# Patient Record
Sex: MALE | Race: WHITE | URBAN - METROPOLITAN AREA
[De-identification: names, ages, dates, MRNs, and addresses within clinical notes are randomized per-mention and may not be internally consistent; named-entity substitution may affect disease eponyms.]

---
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
## 2019-01-02 RX ORDER — ERGOCALCIFEROL 1.25 MG/1
CAPSULE ORAL
Qty: 9 CAP | Refills: 0 | OUTPATIENT
Start: 2019-01-02

## 2019-01-02 NOTE — TELEPHONE ENCOUNTER
Unable to reach patient due to calling restriction on his phone. Letter mailed to notify patient since this is the only phone number we have on file for patient.

## 2019-03-25 ENCOUNTER — TELEPHONE (OUTPATIENT)
Dept: INTERNAL MEDICINE CLINIC | Age: 19
End: 2019-03-25

## 2019-03-25 NOTE — TELEPHONE ENCOUNTER
Called to triage patient per NICOLE Shabazz, patient is currently scheduled with Dr. Phuong Longoria today for head cold. Called and spoke with patient he stated that his main reason for coming in is for a tooth ache. He stated that the tooth ache has been going on for 2 plus weeks and that he has tried OTC measures and not much is helping. He stated that he has been in contact with his Dentist and is currently scheduled to be seen for Friday 3/29/19. I spoke with NICOLE Shabazz who advised for patient to alternate tylenol and ibuprofen for the pain and to keep mouth clean by using Listerine and can also use Orajel to help with the tooth pain. I explained that Sherrell Bryan stated that we are limited with what we can do for dental issues in the office and that seeing the Dentist is what she advises along with the above treatment. Patient verbalized understanding and had no additional questions. Will cancel patients appointment for today and he will follow up with his dentist on Friday 3/29/19.

## 2019-08-20 ENCOUNTER — OFFICE VISIT (OUTPATIENT)
Dept: INTERNAL MEDICINE CLINIC | Age: 19
End: 2019-08-20

## 2019-08-20 VITALS
OXYGEN SATURATION: 98 % | BODY MASS INDEX: 26.22 KG/M2 | TEMPERATURE: 98.6 F | HEART RATE: 89 BPM | HEIGHT: 68 IN | WEIGHT: 173 LBS | SYSTOLIC BLOOD PRESSURE: 120 MMHG | DIASTOLIC BLOOD PRESSURE: 60 MMHG | RESPIRATION RATE: 12 BRPM

## 2019-08-20 DIAGNOSIS — J01.00 ACUTE NON-RECURRENT MAXILLARY SINUSITIS: Primary | ICD-10-CM

## 2019-08-20 DIAGNOSIS — H69.82 DYSFUNCTION OF LEFT EUSTACHIAN TUBE: ICD-10-CM

## 2019-08-20 RX ORDER — AMOXICILLIN 500 MG/1
CAPSULE ORAL
Qty: 21 CAP | Refills: 0 | Status: SHIPPED | OUTPATIENT
Start: 2019-08-20 | End: 2019-10-18 | Stop reason: ALTCHOICE

## 2019-08-20 NOTE — PROGRESS NOTES
Kristi Xiong III,born 2000, is a 20 y.o. male, who is seen today for a one-month history of some pressure in the maxillary regions and some postnasal drip. He has to speak at work and he is having mucus in his throat now all for the last week or so pressure in the left ear. No fever chills. No cough. Started amoxicillin, leftover from a prescription given to his grandmother, has been using it for 2 days. He has not used any other medicine. History reviewed. No pertinent past medical history. Current Outpatient Medications   Medication Sig Dispense Refill    amoxicillin (AMOXIL) 500 mg capsule 1 capsule by mouth 3 times a day 21 Cap 0     Visit Vitals  /60 (BP 1 Location: Left arm, BP Patient Position: Sitting)   Pulse 89   Temp 98.6 °F (37 °C) (Oral)   Resp 12   Ht 5' 8\" (1.727 m)   Wt 173 lb (78.5 kg)   SpO2 98%   BMI 26.30 kg/m²     The left tympanic membrane is dull but there is no redness or wax. On the right there is normal light reflex from the tympanic membrane and no redness or wax. Pharynx reveals rather large tonsils bilaterally but no redness or exudate. Neck reveals no adenopathy or tenderness. There is no tenderness to press on the left ear. Nasal passages are clear. X-ray sinuses minimally tender bilaterally. Assessment: Maxillary sinus pressure and possible infection, going on for nearly a month. Will treat with another 7 days of amoxicillin for a total of 9 days, and have recommended he get some Sudafed to improve the eustachian tube dysfunction that he is currently having on the left possible serous otitis media on the left. Rommel Ramos MD FACP    Please note: This document has been produced using voice recognition software. Unrecognized errors in transcription may be present.

## 2019-10-18 ENCOUNTER — OFFICE VISIT (OUTPATIENT)
Dept: INTERNAL MEDICINE CLINIC | Age: 19
End: 2019-10-18

## 2019-10-18 ENCOUNTER — HOSPITAL ENCOUNTER (OUTPATIENT)
Dept: LAB | Age: 19
Discharge: HOME | End: 2019-10-18
Payer: COMMERCIAL

## 2019-10-18 VITALS
WEIGHT: 174.4 LBS | BODY MASS INDEX: 26.43 KG/M2 | HEART RATE: 88 BPM | OXYGEN SATURATION: 100 % | DIASTOLIC BLOOD PRESSURE: 83 MMHG | SYSTOLIC BLOOD PRESSURE: 122 MMHG | RESPIRATION RATE: 14 BRPM | HEIGHT: 68 IN | TEMPERATURE: 98.2 F

## 2019-10-18 DIAGNOSIS — Z23 ENCOUNTER FOR IMMUNIZATION: ICD-10-CM

## 2019-10-18 DIAGNOSIS — E55.9 VITAMIN D DEFICIENCY: ICD-10-CM

## 2019-10-18 DIAGNOSIS — R53.83 FATIGUE, UNSPECIFIED TYPE: ICD-10-CM

## 2019-10-18 DIAGNOSIS — J30.9 ALLERGIC RHINITIS, UNSPECIFIED SEASONALITY, UNSPECIFIED TRIGGER: ICD-10-CM

## 2019-10-18 DIAGNOSIS — F32.1 MODERATE MAJOR DEPRESSION (HCC): Primary | ICD-10-CM

## 2019-10-18 LAB
ALBUMIN SERPL-MCNC: 4.6 G/DL (ref 3.4–5)
ALBUMIN/GLOB SERPL: 1.6 {RATIO} (ref 0.8–1.7)
ALP SERPL-CCNC: 79 U/L (ref 45–117)
ALT SERPL-CCNC: 36 U/L (ref 16–61)
ANION GAP SERPL CALC-SCNC: 11 MMOL/L (ref 3–18)
AST SERPL-CCNC: 20 U/L (ref 10–38)
BASOPHILS # BLD: 0 K/UL (ref 0–0.1)
BASOPHILS NFR BLD: 0 % (ref 0–2)
BILIRUB SERPL-MCNC: 0.4 MG/DL (ref 0.2–1)
BUN SERPL-MCNC: 13 MG/DL (ref 7–18)
BUN/CREAT SERPL: 13 (ref 12–20)
CALCIUM SERPL-MCNC: 9.2 MG/DL (ref 8.5–10.1)
CHLORIDE SERPL-SCNC: 103 MMOL/L (ref 100–111)
CO2 SERPL-SCNC: 26 MMOL/L (ref 21–32)
CREAT SERPL-MCNC: 1 MG/DL (ref 0.6–1.3)
DIFFERENTIAL METHOD BLD: ABNORMAL
EOSINOPHIL # BLD: 0.2 K/UL (ref 0–0.4)
EOSINOPHIL NFR BLD: 5 % (ref 0–5)
ERYTHROCYTE [DISTWIDTH] IN BLOOD BY AUTOMATED COUNT: 13 % (ref 11.6–14.5)
GLOBULIN SER CALC-MCNC: 2.8 G/DL (ref 2–4)
GLUCOSE SERPL-MCNC: 83 MG/DL (ref 74–99)
HCT VFR BLD AUTO: 47.7 % (ref 36–48)
HGB BLD-MCNC: 15.7 G/DL (ref 13–16)
LYMPHOCYTES # BLD: 1.3 K/UL (ref 0.9–3.6)
LYMPHOCYTES NFR BLD: 28 % (ref 21–52)
MCH RBC QN AUTO: 30.1 PG (ref 24–34)
MCHC RBC AUTO-ENTMCNC: 32.9 G/DL (ref 31–37)
MCV RBC AUTO: 91.4 FL (ref 74–97)
MONOCYTES # BLD: 0.5 K/UL (ref 0.05–1.2)
MONOCYTES NFR BLD: 11 % (ref 3–10)
NEUTS SEG # BLD: 2.5 K/UL (ref 1.8–8)
NEUTS SEG NFR BLD: 56 % (ref 40–73)
PLATELET # BLD AUTO: 290 K/UL (ref 135–420)
PMV BLD AUTO: 9.2 FL (ref 9.2–11.8)
POTASSIUM SERPL-SCNC: 4 MMOL/L (ref 3.5–5.5)
PROT SERPL-MCNC: 7.4 G/DL (ref 6.4–8.2)
RBC # BLD AUTO: 5.22 M/UL (ref 4.7–5.5)
SODIUM SERPL-SCNC: 140 MMOL/L (ref 136–145)
WBC # BLD AUTO: 4.5 K/UL (ref 4.6–13.2)

## 2019-10-18 PROCEDURE — 82306 VITAMIN D 25 HYDROXY: CPT

## 2019-10-18 PROCEDURE — 85025 COMPLETE CBC W/AUTO DIFF WBC: CPT

## 2019-10-18 PROCEDURE — 84439 ASSAY OF FREE THYROXINE: CPT

## 2019-10-18 PROCEDURE — 80053 COMPREHEN METABOLIC PANEL: CPT

## 2019-10-18 RX ORDER — FLUTICASONE PROPIONATE 50 MCG
2 SPRAY, SUSPENSION (ML) NASAL DAILY
Qty: 1 BOTTLE | Refills: 11 | Status: SHIPPED | OUTPATIENT
Start: 2019-10-18

## 2019-10-18 RX ORDER — VENLAFAXINE HYDROCHLORIDE 37.5 MG/1
37.5 CAPSULE, EXTENDED RELEASE ORAL DAILY
Qty: 30 CAP | Refills: 2 | Status: SHIPPED | OUTPATIENT
Start: 2019-10-18 | End: 2022-01-05

## 2019-10-18 NOTE — PROGRESS NOTES
Chief Complaint   Patient presents with    Establish Care     ROOM 3    Fatigue     x 6 months with fatigue, and hard to focus at work     Patient will bring in his immunization record at next visit. 1. Have you been to the ER, urgent care clinic since your last visit? Hospitalized since your last visit? No    2. Have you seen or consulted any other health care providers outside of the [address] [place] [address] since your last visit? Include any pap smears or colon screening. No    Patient was given a copy of the Advanced Directive and understands to bring it in once completed.

## 2019-10-18 NOTE — PROGRESS NOTES
[facility]:  10/18/2019, MRN: [id number]      Franco Guevara III is a 20 y.o. male and presents to clinic to Martha Acuña (ROOM 3) and Fatigue (x 6 months with fatigue, and hard to focus at work)    Subjective: The patient is a 18-year-old male with history of acne vulgaris, nicotine dependence, depression, marijuana use per EHR, vitamin D deficiency, and anxiety. 1. Fatigue, Depression/Anxiety, and Difficulty Focusing: Present x 6 months. Reports difficulty focusing at work. Sleepin hours per night. Depression: off/on. He took wellbutrin in the past. No adverse side effects from this rx. He reports better depression since moving out of his mom's house. He lives with his grandmother. He works \"different\" hours than his friends and is not social. He has some loneliness. 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 He reports some difficulty focusing. He is not Muslim. He reports some difficulty with focusing on completion of tasks. He has never been diagnosed with ADD or ADHD. He has never been checked for these conditions. Nicotine Dependence: none  ETOH: yes, he consumes on avg 5 times a wk - he will consume a \"drink or two. \"   Energy drinks: yes, he consumes energy drinks 3 days a wk  Drug use: none    2. Vitamin D Deficiency: He is not taking vitamin D but his last level was very low. Patient Active Problem List    Diagnosis Date Noted    Anxiety 12/10/2018    Vitamin D deficiency 12/10/2018    Marijuana use 2018    Moderate major depression ([address] 2018    Tobacco dependence 2017           No Known Allergies    History reviewed. No pertinent past medical history. History reviewed. No pertinent surgical history.     Family History   Problem Relation Age of Onset    No Known Problems Mother     No Known Problems Father        Social History     Tobacco Use    Smoking status: Current Some Day Smoker     Packs/day: 0.50     Years: 2.00     Pack years: 1.00     Types: Cigarettes    Smokeless tobacco: Never Used   Substance Use Topics    Alcohol use: Yes     Alcohol/week: 1.0 standard drinks     Types: 1 Shots of liquor per week       ROS   Review of Systems   Constitutional: Positive for malaise/fatigue. Negative for chills and fever. HENT: Negative for ear pain and sore throat. Eyes: Negative for blurred vision and pain. Respiratory: Negative for shortness of breath. Cardiovascular: Negative for chest pain. Gastrointestinal: Negative for abdominal pain, blood in stool and melena. Genitourinary: Negative for dysuria and hematuria. Musculoskeletal: Negative for joint pain and myalgias. Skin: Negative for rash. Neurological: Negative for tingling, focal weakness and headaches. Endo/Heme/Allergies: Positive for environmental allergies. Does not bruise/bleed easily. Psychiatric/Behavioral: Positive for depression. Negative for substance abuse and suicidal ideas. The patient is nervous/anxious. Objective     Vitals:    10/18/19 0813   BP: 122/83   Pulse: 88   Resp: 14   Temp: 98.2 °F (36.8 °C)   TempSrc: Oral   SpO2: 100%   Weight: 174 lb 6.4 oz (79.1 kg)   Height: 5' 8\" (1.727 m)   PainSc:   0 - No pain       Physical Exam   Constitutional: He is oriented to person, place, and time and well-developed, well-nourished, and in no distress. HENT:   Head: Normocephalic and atraumatic. Right Ear: External ear normal.   Left Ear: External ear normal.   Nose: Nose normal.   Mouth/Throat: Oropharynx is clear and moist. No oropharyngeal exudate. Eyes: Pupils are equal, round, and reactive to light. Conjunctivae and EOM are normal. Right eye exhibits no discharge. Left eye exhibits no discharge. No scleral icterus. Neck: Neck supple. Cardiovascular: Normal rate, regular rhythm, normal heart sounds and intact distal pulses. Pulmonary/Chest: Effort normal and breath sounds normal. No respiratory distress.  He has no wheezes. Abdominal: Soft. Bowel sounds are normal. He exhibits no distension. There is no tenderness. Musculoskeletal: He exhibits no edema or tenderness ([place]). Lymphadenopathy:     He has no cervical adenopathy. Neurological: He is alert and oriented to person, place, and time. He exhibits normal muscle tone. Gait normal.   Skin: Skin is warm and dry. No erythema. Psychiatric: Affect normal.   Nursing note and vitals reviewed. LABS   Data Review:   Lab Results   Component Value Date/Time    WBC 4.0 (L) 08/11/2017 02:18 PM    HGB 16.2 (H) 08/11/2017 02:18 PM    HCT 46.5 08/11/2017 02:18 PM    PLATELET 178 46/19/8130 02:18 PM    MCV 84.2 08/11/2017 02:18 PM       Lab Results   Component Value Date/Time    Sodium 139 08/11/2017 02:18 PM    Potassium 3.9 08/11/2017 02:18 PM    Chloride 103 08/11/2017 02:18 PM    CO2 29 08/11/2017 02:18 PM    Anion gap 7 08/11/2017 02:18 PM    Glucose 85 08/11/2017 02:18 PM    BUN 11 08/11/2017 02:18 PM    Creatinine 0.89 08/11/2017 02:18 PM    BUN/Creatinine ratio 12 08/11/2017 02:18 PM    GFR est AA >60 08/11/2017 02:18 PM    GFR est non-AA >60 08/11/2017 02:18 PM    Calcium 9.1 08/11/2017 02:18 PM       Assessment/Plan:   1. Moderate Major Depression and Fatigue  - Ordering Effexor.  -Return to clinic to assess his response. The patient was instructed to stop his medication if he develops any adverse side effects. He was also instructed to notify us if this happens.  -Checking labs. -I encouraged him to seek counseling services.  -I encouraged him to dream big. I encouraged him to in addition his life 3 years from now, and to determine what career he wants to pursue long-term. ORDERS:  - venlafaxine-SR (EFFEXOR-XR) 37.5 mg capsule; Take 1 Cap by mouth daily. Dispense: 30 Cap; Refill: 2  - CBC WITH AUTOMATED DIFF; Future  - METABOLIC PANEL, COMPREHENSIVE; Future  - TSH AND FREE T4; Future    2.  Vitamin D deficiency: Not taking vitamin D over-the-counter.  -I will check a vitamin D and CMP. ORDERS:  - METABOLIC PANEL, COMPREHENSIVE; Future  - VITAMIN D, 25 HYDROXY; Future    3. Allergic rhinitis:   -Ordering Flonase. ORDERS:  - fluticasone propionate (FLONASE) 50 mcg/actuation nasal spray; 2 Sprays by Both Nostrils route daily. Dispense: 1 Bottle; Refill: 11    4. Health Maintenance:  -The flu vaccine was administered today. ORDERS:  - INFLUENZA VIRUS VAC QUAD,SPLIT,PRESV FREE SYRINGE IM  - WA IMMUNIZ ADMIN,1 SINGLE/COMB VAC/TOXOID        Health Maintenance Due   Topic Date Due    HPV Age 9Y-34Y (1 - Male 3-dose series) 04/10/2014    DTaP/Tdap/Td series (2 - Td) 09/22/2017    Influenza Age 5 to Adult  08/01/2019     Lab review: labs are reviewed in the EHR and ordered as mentioned above. I have discussed the diagnosis with the patient and the intended plan as seen in the above orders. The patient has received an after-visit summary and questions were answered concerning future plans. I have discussed medication side effects and warnings with the patient as well. I have reviewed the plan of care with the patient, accepted their input and they are in agreement with the treatment goals. All questions were answered. The patient understands the plan of care. Handouts provided today with above information. Pt instructed if symptoms worsen to call the office or report to the ED for continued care. Greater than 50% of the visit time was spent in counseling and/or coordination of care. Voice recognition was used to generate this report, which may have resulted in some phonetic based errors in grammar and contents. Even though attempts were made to correct all the mistakes, some may have been missed, and remained in the body of the document.           Jorge Lr MD

## 2019-10-18 NOTE — PATIENT INSTRUCTIONS
Patient will bring in immunization record at his next visit to review. Fatigue: Care Instructions  Your Care Instructions    Fatigue is a feeling of tiredness, exhaustion, or lack of energy. You may feel fatigue because of too much or not enough activity. It can also come from stress, lack of sleep, boredom, and poor diet. Many medical problems, such as viral infections, can cause fatigue. Emotional problems, especially depression, are often the cause of fatigue. Fatigue is most often a symptom of another problem. Treatment for fatigue depends on the cause. For example, if you have fatigue because you have a certain health problem, treating this problem also treats your fatigue. If depression or anxiety is the cause, treatment may help. Follow-up care is a key part of your treatment and safety. Be sure to make and go to all appointments, and call your doctor if you are having problems. It's also a good idea to know your test results and keep a list of the medicines you take. How can you care for yourself at home? · Get regular exercise. But don't overdo it. Go back and forth between rest and exercise. · Get plenty of rest.  · Eat a healthy diet. Do not skip meals, especially breakfast.  · Reduce your use of caffeine, tobacco, and alcohol. Caffeine is most often found in coffee, tea, cola drinks, and chocolate. · Limit medicines that can cause fatigue. This includes tranquilizers and cold and allergy medicines. When should you call for help? Watch closely for changes in your health, and be sure to contact your doctor if:    · You have new symptoms such as fever or a rash.     · Your fatigue gets worse.     · You have been feeling down, depressed, or hopeless. Or you may have lost interest in things that you usually enjoy.     · You are not getting better as expected. Where can you learn more? Go to http://keyanna-liliana.info/.   Enter M225 in the search box to learn more about \"Fatigue: Care Instructions. \"  Current as of: June 26, 2019  Content Version: 12.2  © 0257-2166 [employer]. Care instructions adapted under license by [employer] (which disclaims liability or warranty for this information). If you have questions about a medical condition or this instruction, always ask your healthcare professional. [address] any warranty or liability for your use of this information. Vaccine Information Statement    Influenza (Flu) Vaccine (Inactivated or Recombinant): What You Need to Know    Many Vaccine Information Statements are available in [place] and other languages. See www.immunize.org/vis  Hojas de información sobre vacunas están disponibles en español y en muchos otros idiomas. Visite www.immunize.org/vis    1. Why get vaccinated? Influenza vaccine can prevent influenza (flu). Flu is a contagious disease that spreads around the [place] every year, usually between October and May. Anyone can get the flu, but it is more dangerous for some people. Infants and young children, people 72years of age and older, pregnant women, and people with certain health conditions or a weakened immune system are at greatest risk of flu complications. Pneumonia, bronchitis, sinus infections and ear infections are examples of flu-related complications. If you have a medical condition, such as heart disease, cancer or diabetes, flu can make it worse. Flu can cause fever and chills, sore throat, muscle aches, fatigue, cough, headache, and runny or stuffy nose. Some people may have vomiting and diarrhea, though this is more common in children than adults. Each year thousands of people in the [facility] die from flu, and many more are hospitalized. Flu vaccine prevents millions of illnesses and flu-related visits to the doctor each year.     2. Influenza vaccines     CDC recommends everyone 10months of age and older get vaccinated every flu season. Children 6 months through 6years of age may need 2 doses during a single flu season. Everyone else needs only 1 dose each flu season. It takes about 2 weeks for protection to develop after vaccination. There are many flu viruses, and they are always changing. Each year a new flu vaccine is made to protect against three or four viruses that are likely to cause disease in the upcoming flu season. Even when the vaccine doesnt exactly match these viruses, it may still provide some protection. Influenza vaccine does not cause flu. Influenza vaccine may be given at the same time as other vaccines. 3. Talk with your health care provider    Tell your vaccine provider if the person getting the vaccine:   Has had an allergic reaction after a previous dose of influenza vaccine, or has any severe, life-threatening allergies.  Has ever had Guillain-Barré Syndrome (also called GBS). In some cases, your health care provider may decide to postpone influenza vaccination to a future visit. People with minor illnesses, such as a cold, may be vaccinated. People who are moderately or severely ill should usually wait until they recover before getting influenza vaccine. Your health care provider can give you more information. 4. Risks of a reaction     Soreness, redness, and swelling where shot is given, fever, muscle aches, and headache can happen after influenza vaccine.  There may be a very small increased risk of Guillain-Barré Syndrome (GBS) after inactivated influenza vaccine (the flu shot). Izola Dieter children who get the flu shot along with pneumococcal vaccine (PCV13), and/or DTaP vaccine at the same time might be slightly more likely to have a seizure caused by fever. Tell your health care provider if a child who is getting flu vaccine has ever had a seizure. People sometimes faint after medical procedures, including vaccination.  Tell your provider if you feel dizzy or have vision changes or ringing in the ears. As with any medicine, there is a very remote chance of a vaccine causing a severe allergic reaction, other serious injury, or death. 5. What if there is a serious problem? An allergic reaction could occur after the vaccinated person leaves the clinic. If you see signs of a severe allergic reaction (hives, swelling of the face and throat, difficulty breathing, a fast heartbeat, dizziness, or weakness), call 9-1-1 and get the person to the nearest hospital.    For other signs that concern you, call your health care provider. Adverse reactions should be reported to the Vaccine Adverse Event Reporting System (VAERS). Your health care provider will usually file this report, or you can do it yourself. Visit the VAERS website at www.vaers. hhs.gov or call 7-445.792.9267. VAERS is only for reporting reactions, and VAERS staff do not give medical advice. 6. The National Vaccine Injury Compensation Program    The [facility] Vaccine Injury Compensation Program (VICP) is a federal program that was created to compensate people who may have been injured by certain vaccines. Visit the VICP website at www.hrsa.gov/vaccinecompensation or call 9-588.945.9936 to learn about the program and about filing a claim. There is a time limit to file a claim for compensation. 7. How can I learn more?  Ask your health care provider.  Call your local or state health department.  Contact the Centers for Disease Control and Prevention (CDC):  - Call 6-274.572.3906 (1-800-CDC-INFO) or  - Visit CDCs influenza website at www.cdc.gov/flu    Vaccine Information Statement (Interim)  Inactivated Influenza Vaccine   8/15/2019  42 CHELAAntonio Storm Howard [id number]-[id number]   Department of Health and Human Services  Centers for Disease Control and Prevention    Office Use Only

## 2019-10-18 NOTE — PROGRESS NOTES
Marely Grady III 2000 male who presents for routine immunizations. Patient denies any symptoms , reactions or allergies that would exclude them from being immunized today. Risks and adverse reactions were discussed and the VIS was given to them. All questions were addressed. Order placed for flu vaccine,  per Verbal Order from [address] [place] [address] with read back. Patient was observed for 15 min post injection. There were no reactions observed.     Wlilie Sinclair LPN

## 2019-10-19 PROBLEM — J30.9 ALLERGIC RHINITIS: Status: ACTIVE | Noted: 2019-10-19

## 2019-10-19 LAB
25(OH)D3 SERPL-MCNC: 16.2 NG/ML (ref 30–100)
T4 FREE SERPL-MCNC: 1 NG/DL (ref 0.7–1.5)
TSH SERPL DL<=0.05 MIU/L-ACNC: 1.52 UIU/ML (ref 0.36–3.74)

## 2019-10-21 DIAGNOSIS — E55.9 VITAMIN D DEFICIENCY: Primary | ICD-10-CM

## 2019-10-21 RX ORDER — ERGOCALCIFEROL 1.25 MG/1
50000 CAPSULE ORAL
Qty: 4 CAP | Refills: 6 | Status: SHIPPED | OUTPATIENT
Start: 2019-10-21 | End: 2022-01-05

## 2019-10-22 ENCOUNTER — TELEPHONE (OUTPATIENT)
Dept: INTERNAL MEDICINE CLINIC | Age: 19
End: 2019-10-22

## 2019-10-22 NOTE — TELEPHONE ENCOUNTER
I tried to call the patient, but his phone just rang without a voice mail set up to leave a message.

## 2019-10-22 NOTE — TELEPHONE ENCOUNTER
----- Message from Luis F Alex MD sent at 10/21/2019  5:31 PM EDT -----  Please let him know that his TFTs are normal.  His vitamin D level is low at 16.2. I am ordering prescription strength vitamin D. His CMP is unremarkable. His CBC shows a mildly low white blood cell count of 4.5. No additional studies are warranted for that finding though as remainder of his CBC is unremarkable.     Dr. Jj De La Cruz  [facility] [address].  Phone: [phone]  Fax: [phone]

## 2020-01-10 ENCOUNTER — OFFICE VISIT (OUTPATIENT)
Dept: INTERNAL MEDICINE CLINIC | Age: 20
End: 2020-01-10

## 2020-01-10 VITALS
HEIGHT: 68 IN | WEIGHT: 136.6 LBS | SYSTOLIC BLOOD PRESSURE: 124 MMHG | RESPIRATION RATE: 12 BRPM | HEART RATE: 95 BPM | TEMPERATURE: 99 F | DIASTOLIC BLOOD PRESSURE: 70 MMHG | BODY MASS INDEX: 20.7 KG/M2 | OXYGEN SATURATION: 99 %

## 2020-01-10 DIAGNOSIS — R68.89 FLU-LIKE SYMPTOMS: Primary | ICD-10-CM

## 2020-01-10 LAB
QUICKVUE INFLUENZA TEST: NEGATIVE
VALID INTERNAL CONTROL?: YES

## 2020-01-10 NOTE — LETTER
NOTIFICATION RETURN TO WORK  
 
1/10/2020 11:51 AM 
 
Mr. [address] [facility] [address] To Whom It May Concern: 
 
Jayden Pascual III is currently under the care of Therese Ovalle. He has been unable to work January 9 and 10, 2020, but will be able to return to work January 13, 2020. Sincerely, Rommel Sue, [address] [facility]

## 2020-01-10 NOTE — PROGRESS NOTES
Chelsey Archer III,born 2000, is a 20 y.o. male, who is seen today for acute illness. The evening before last he developed some slight chilliness and frontal headache, these both persist but are not severe or getting worse. His throat has felt tight this whole time but not really sore to swallow. He has had a little achiness of his legs at times. No cough. No discomfort or fullness in the ears. Minimal nasal congestion. History reviewed. No pertinent past medical history. Current Outpatient Medications   Medication Sig Dispense Refill    ergocalciferol (ERGOCALCIFEROL) 50,000 unit capsule Take 1 Cap by mouth every seven (7) days. 4 Cap 6    venlafaxine-SR (EFFEXOR-XR) 37.5 mg capsule Take 1 Cap by mouth daily. 30 Cap 2    fluticasone propionate (FLONASE) 50 mcg/actuation nasal spray 2 Sprays by Both Nostrils route daily. 1 Bottle 11     Visit Vitals  /70 (BP 1 Location: Left arm, BP Patient Position: Sitting)   Pulse 95   Temp 99 °F (37.2 °C) (Oral)   Resp 12   Ht 5' 8\" (1.727 m)   Wt 136 lb 9.6 oz (62 kg)   SpO2 99%   BMI 20.77 kg/m²     Nasal passages are clear. Frontal and maxillary regions are nontender. Pharynx reveals no redness exudate or drainage. Neck reveals no tenderness or adenopathy. Lungs are clear to auscultation with no crackles and no wheezing. Influenza screen is negative. Assessment: Viral syndrome, he was out of work yesterday and today and should be able to go back to work the first of the week, January 13. We will give him a work note. He will treat symptoms symptomatically with Tylenol or ibuprofen etc.  He will call if symptoms worsen or new symptoms develop. Rommel Mcguire MD FACP    Please note: This document has been produced using voice recognition software. Unrecognized errors in transcription may be present.

## 2021-02-18 NOTE — PROGRESS NOTES
Clarita Tenorio presents today for   Chief Complaint   Patient presents with    Hemorrhoids     Patient has tried OTC without relief. Coordination of Care:  1. Have you been to the ER, urgent care clinic since your last visit? Hospitalized since your last visit? NO    2. Have you seen or consulted any other health care providers outside of the [address] [place] [address] since your last visit? Include any pap smears or colon screening.  NO

## 2021-02-19 ENCOUNTER — OFFICE VISIT (OUTPATIENT)
Dept: INTERNAL MEDICINE CLINIC | Age: 21
End: 2021-02-19
Payer: COMMERCIAL

## 2021-02-19 VITALS
DIASTOLIC BLOOD PRESSURE: 87 MMHG | HEIGHT: 68 IN | RESPIRATION RATE: 20 BRPM | TEMPERATURE: 98.2 F | HEART RATE: 82 BPM | OXYGEN SATURATION: 97 % | WEIGHT: 165 LBS | SYSTOLIC BLOOD PRESSURE: 132 MMHG | BODY MASS INDEX: 25.01 KG/M2

## 2021-02-19 DIAGNOSIS — K64.9 HEMORRHOIDS, UNSPECIFIED HEMORRHOID TYPE: Primary | ICD-10-CM

## 2021-02-19 PROCEDURE — 99214 OFFICE O/P EST MOD 30 MIN: CPT | Performed by: INTERNAL MEDICINE

## 2021-02-19 RX ORDER — HYDROCORTISONE 25 MG/G
CREAM TOPICAL 4 TIMES DAILY
Qty: 30 G | Refills: 5 | Status: SHIPPED | OUTPATIENT
Start: 2021-02-19

## 2021-02-19 RX ORDER — ASPIRIN 81 MG
100 TABLET, DELAYED RELEASE (ENTERIC COATED) ORAL 2 TIMES DAILY
Qty: 60 TAB | Refills: 6 | Status: SHIPPED | OUTPATIENT
Start: 2021-02-19

## 2021-02-19 RX ORDER — POLYETHYLENE GLYCOL 3350 17 G/17G
17 POWDER, FOR SOLUTION ORAL DAILY
Qty: 30 PACKET | Refills: 5 | Status: SHIPPED | OUTPATIENT
Start: 2021-02-19

## 2021-02-19 NOTE — PROGRESS NOTES
[facility]:  2/19/2021, MRN: [id number]      Jessica Hutton III is a 23 y.o. male and presents to clinic for Hemorrhoids (Patient has tried OTC without relief x 2 months. )      Subjective: The patient is a 23-year-old male with history of acne vulgaris, nicotine dependence, depression, marijuana use per EHR, vitamin D deficiency, and anxiety. Hemorrhoids: Present x few months. +Associated with BRBPR. No abdominal pain. Not responsive to OTC rx. He does not consume a lot of leafy greens. +Straining. No sexual contact along his rectum. Pain is only present on his rectum when he wipes. He states that the area is more irritated versus painful. +ETOH use (Saturdays only, he will consume 10 shots of liquour)  Caffeine intake (\"I drink coffee throughout the day. \" He has 1-2 cups per day)  No drug use recently    Patient Active Problem List    Diagnosis Date Noted    Allergic rhinitis 10/19/2019    Anxiety 12/10/2018    Vitamin D deficiency 12/10/2018    Marijuana use 02/23/2018    Moderate major depression ([address] 02/23/2018    Tobacco dependence 08/25/2017       Current Outpatient Medications   Medication Sig Dispense Refill    ergocalciferol (ERGOCALCIFEROL) 50,000 unit capsule Take 1 Cap by mouth every seven (7) days. 4 Cap 6    venlafaxine-SR (EFFEXOR-XR) 37.5 mg capsule Take 1 Cap by mouth daily. 30 Cap 2    fluticasone propionate (FLONASE) 50 mcg/actuation nasal spray 2 Sprays by Both Nostrils route daily. 1 Bottle 11       No Known Allergies    No past medical history on file. No past surgical history on file. Family History   Problem Relation Age of Onset    No Known Problems Mother     No Known Problems Father        Social History     Tobacco Use    Smoking status: Current Some Day Smoker     Packs/day: 0.50     Years: 2.00     Pack years: 1.00     Types: Cigarettes    Smokeless tobacco: Never Used   Substance Use Topics    Alcohol use:  Yes     Alcohol/week: 1.0 standard drinks     Types: 1 Shots of liquor per week       ROS   Review of Systems   Constitutional: Negative for chills and fever. HENT: Negative for ear pain and sore throat. Eyes: Negative for blurred vision and pain. Respiratory: Negative for cough and shortness of breath. Cardiovascular: Negative for chest pain. Gastrointestinal: Positive for blood in stool. Negative for abdominal pain and melena. Genitourinary: Negative for dysuria and hematuria. Musculoskeletal: Negative for joint pain and myalgias. Skin: Negative for rash. Neurological: Negative for tingling, focal weakness and headaches. Endo/Heme/Allergies: Does not bruise/bleed easily. Psychiatric/Behavioral: Negative for substance abuse. Objective     Vitals:    02/19/21 1453   BP: 132/87   Pulse: 82   Resp: 20   Temp: 98.2 °F (36.8 °C)   TempSrc: Temporal   SpO2: 97%   Weight: 165 lb (74.8 kg)   Height: 5' 8\" (1.727 m)   PainSc:   0 - No pain       Physical Exam  Vitals signs and nursing note reviewed. Constitutional:       Appearance: Normal appearance. HENT:      Head: Normocephalic and atraumatic. Right Ear: External ear normal.      Left Ear: External ear normal.   Neck:      Musculoskeletal: Neck supple. Cardiovascular:      Rate and Rhythm: Normal rate and regular rhythm. Pulses: Normal pulses. Heart sounds: Normal heart sounds. Pulmonary:      Effort: Pulmonary effort is normal.      Breath sounds: Normal breath sounds. Abdominal:      General: Abdomen is flat. Bowel sounds are normal. There is no distension. Palpations: Abdomen is soft. Tenderness: There is no abdominal tenderness. Genitourinary:     Comments: No external hemorrhoids appreciated on exam  Musculoskeletal:         General: No swelling (BUE) or tenderness (BUE). Lymphadenopathy:      Cervical: No cervical adenopathy. Skin:     General: Skin is warm and dry. Findings: No erythema.    Neurological:      General: No focal deficit present. Mental Status: He is alert. Gait: Gait normal.   Psychiatric:         Mood and Affect: Mood normal.         LABS   Data Review:   Lab Results   Component Value Date/Time    WBC 4.5 (L) 10/18/2019 08:50 AM    HGB 15.7 10/18/2019 08:50 AM    HCT 47.7 10/18/2019 08:50 AM    PLATELET 983 50/36/2436 08:50 AM    MCV 91.4 10/18/2019 08:50 AM       Lab Results   Component Value Date/Time    Sodium 140 10/18/2019 08:50 AM    Potassium 4.0 10/18/2019 08:50 AM    Chloride 103 10/18/2019 08:50 AM    CO2 26 10/18/2019 08:50 AM    Anion gap 11 10/18/2019 08:50 AM    Glucose 83 10/18/2019 08:50 AM    BUN 13 10/18/2019 08:50 AM    Creatinine 1.00 10/18/2019 08:50 AM    BUN/Creatinine ratio 13 10/18/2019 08:50 AM    GFR est AA >60 10/18/2019 08:50 AM    GFR est non-AA >60 10/18/2019 08:50 AM    Calcium 9.2 10/18/2019 08:50 AM       No results found for: CHOL, CHOLPOCT, CHOLX, CHLST, CHOLV, HDL, HDLPOC, HDLP, LDL, LDLCPOC, LDLC, DLDLP, VLDLC, VLDL, TGLX, TRIGL, TRIGP, TGLPOCT, CHHD, CHHDX    No results found for: HBA1C, HGBE8, [id number], GCU3UXOS    Assessment/Plan:   Hemorrhoids (internal)  - Anusol cream recommended. I ordered docusate and MiraLAX. He can take these in order to prevent constipation. I encouraged him to stay hydrated, drinking water throughout the day. I encouraged him to increase his fiber intake. He was instructed to consume at least 30 g of fiber per day. I encouraged him to also use a squatty potty. He let them know if his symptoms do not resolve. At that time, I will recommend that he see a GI doctor or colorectal surgeon for evaluation. We discussed the use of RectiCare and Preparation H over-the-counter as well. I instructed him to stop all caffeine and alcohol beverage consumption. ORDERS:  - hydrocortisone (ANUSOL-HC) 2.5 % rectal cream; Insert  into rectum four (4) times daily.   Dispense: 30 g; Refill: 5      Health Maintenance Due   Topic Date Due    HPV Age 9Y-34Y (1 - Male 2-dose series) 04/10/2010    Flu Vaccine (1) 09/01/2020     Lab review: labs are reviewed in the EHR    I have discussed the diagnosis with the patient and the intended plan as seen in the above orders. The patient has received an after-visit summary and questions were answered concerning future plans. I have discussed medication side effects and warnings with the patient as well. I have reviewed the plan of care with the patient, accepted their input and they are in agreement with the treatment goals. All questions were answered. The patient understands the plan of care. Handouts provided today with above information. Pt instructed if symptoms worsen to call the office or report to the ED for continued care. Greater than 50% of the visit time was spent in counseling and/or coordination of care. Voice recognition was used to generate this report, which may have resulted in some phonetic based errors in grammar and contents. Even though attempts were made to correct all the mistakes, some may have been missed, and remained in the body of the document.           Andres Peralta MD

## 2021-02-19 NOTE — PATIENT INSTRUCTIONS
Mineral Oil (By mouth) Mineral Oil (MIN-er-al oyl) Treats constipation. Brand Name(s): Good Sense Mineral Oil, Quality Choice Mineral Oil, [place] Mineral Oil There may be other brand names for this medicine. When This Medicine Should Not Be Used: You should not use this medicine if you have had an allergic reaction to mineral oil. You should not use this medicine if you are pregnant or bedridden, or if you have trouble swallowing. You should not give this medicine to a child under 10years of age. How to Use This Medicine:  
Liquid · Follow the instructions on the medicine label if you are using this medicine without a prescription. · It is best to take this medicine on an empty stomach. If a dose is missed: · This medicine is to be taken as needed. Follow the directions on the package. How to Store and Dispose of This Medicine: · Store the medicine in a closed container at room temperature, away from heat, moisture, and direct light. · Ask your pharmacist, doctor, or health caregiver about the best way to dispose of any outdated medicine or medicine no longer needed. · Keep all medicine out of the reach of children. Never share your medicine with anyone. Drugs and Foods to Avoid: Ask your doctor or pharmacist before using any other medicine, including over-the-counter medicines, vitamins, and herbal products. · Ask your doctor before using this medicine if you are also using other treatments for constipation, such as a stool softener. Warnings While Using This Medicine: · Make sure your doctor knows if you have severe stomach pain, nausea, or vomiting before using this medicine. · Make sure your doctor knows if you have had a sudden change in your bowel movements that has lasted for more than 2 weeks. · You should not use this laxative for longer than 1 week unless approved by your doctor. Laxatives may be habit-forming and can harm your bowels if you use them too long. · You may not see results from this medicine for 6 to 8 hours after you use it. · Stop using this medicine and call your doctor if you have any blood in your stool, bleeding from your rectum, or if you are still unable to have a bowel movement. Possible Side Effects While Using This Medicine:  
Call your doctor right away if you notice any of these side effects: · Allergic reaction: Itching or hives, swelling in your face or hands, swelling or tingling in your mouth or throat, chest tightness, trouble breathing If you notice these less serious side effects, talk with your doctor: · Nausea or vomiting. If you notice other side effects that you think are caused by this medicine, tell your doctor. Call your doctor for medical advice about side effects. You may report side effects to FDA at 2-318-FDA-2856 © 2017 [facility] Information is for End User's use only and may not be sold, redistributed or otherwise used for commercial purposes. The above information is an  only. It is not intended as medical advice for individual conditions or treatments. Talk to your doctor, nurse or pharmacist before following any medical regimen to see if it is safe and effective for you.

## 2021-03-23 ENCOUNTER — TELEPHONE (OUTPATIENT)
Dept: INTERNAL MEDICINE CLINIC | Age: 21
End: 2021-03-23

## 2021-03-23 NOTE — TELEPHONE ENCOUNTER
Ok to offer in office visit tomorrow at 7:45am.    Dr. Bjorn Johansen  [facility], [facility], [address] [place] [address] Str.  Phone: [phone]  Fax: [phone]

## 2021-03-23 NOTE — PROGRESS NOTES
Bridger Boyd presents today for   Chief Complaint   Patient presents with    Exposure to STD     Patient reports he was exposed to chlamydia. Patient denies any symptoms                Coordination of Care:  1. Have you been to the ER, urgent care clinic since your last visit? Hospitalized since your last visit? no    2. Have you seen or consulted any other health care providers outside of the [address] [place] [address] since your last visit? Include any pap smears or colon screening.  no

## 2021-03-24 ENCOUNTER — OFFICE VISIT (OUTPATIENT)
Dept: INTERNAL MEDICINE CLINIC | Age: 21
End: 2021-03-24
Payer: COMMERCIAL

## 2021-03-24 ENCOUNTER — HOSPITAL ENCOUNTER (OUTPATIENT)
Dept: LAB | Age: 21
Discharge: HOME | End: 2021-03-24
Payer: COMMERCIAL

## 2021-03-24 VITALS
HEIGHT: 68 IN | DIASTOLIC BLOOD PRESSURE: 75 MMHG | TEMPERATURE: 98 F | HEART RATE: 74 BPM | SYSTOLIC BLOOD PRESSURE: 127 MMHG | RESPIRATION RATE: 14 BRPM | OXYGEN SATURATION: 97 % | BODY MASS INDEX: 24.25 KG/M2 | WEIGHT: 160 LBS

## 2021-03-24 DIAGNOSIS — R39.15 URINARY URGENCY: ICD-10-CM

## 2021-03-24 DIAGNOSIS — R39.15 URINARY URGENCY: Primary | ICD-10-CM

## 2021-03-24 DIAGNOSIS — Z11.3 SCREENING EXAMINATION FOR STD (SEXUALLY TRANSMITTED DISEASE): ICD-10-CM

## 2021-03-24 LAB
APPEARANCE UR: CLEAR
BILIRUB UR QL: NEGATIVE
COLOR UR: YELLOW
GLUCOSE UR STRIP.AUTO-MCNC: NEGATIVE MG/DL
HGB UR QL STRIP: NEGATIVE
KETONES UR QL STRIP.AUTO: NEGATIVE MG/DL
LEUKOCYTE ESTERASE UR QL STRIP.AUTO: NEGATIVE
NITRITE UR QL STRIP.AUTO: NEGATIVE
PH UR STRIP: 6 [PH] (ref 5–8)
PROT UR STRIP-MCNC: NEGATIVE MG/DL
SP GR UR REFRACTOMETRY: 1.02 (ref 1–1.03)
UROBILINOGEN UR QL STRIP.AUTO: 1 EU/DL (ref 0.2–1)

## 2021-03-24 PROCEDURE — 99213 OFFICE O/P EST LOW 20 MIN: CPT | Performed by: INTERNAL MEDICINE

## 2021-03-24 PROCEDURE — 36415 COLL VENOUS BLD VENIPUNCTURE: CPT

## 2021-03-24 PROCEDURE — 86803 HEPATITIS C AB TEST: CPT

## 2021-03-24 PROCEDURE — 87591 N.GONORRHOEAE DNA AMP PROB: CPT

## 2021-03-24 PROCEDURE — 87389 HIV-1 AG W/HIV-1&-2 AB AG IA: CPT

## 2021-03-24 PROCEDURE — 81003 URINALYSIS AUTO W/O SCOPE: CPT

## 2021-03-24 PROCEDURE — 86780 TREPONEMA PALLIDUM: CPT

## 2021-03-24 RX ORDER — AZITHROMYCIN 500 MG/1
1000 TABLET, FILM COATED ORAL DAILY
Qty: 2 TAB | Refills: 0 | Status: SHIPPED | OUTPATIENT
Start: 2021-03-24 | End: 2021-03-25

## 2021-03-24 NOTE — PROGRESS NOTES
[facility]:  3/24/2021, MRN: [id number]      Srinath Vanegas III is a 23 y.o. male and presents to clinic for Exposure to STD (Patient reports he was exposed to chlamydia. Patient denies any symptoms)    Subjective: The patient is a 25-year-old male with history of acne vulgaris, nicotine dependence, depression, marijuana use per EHR, vitamin D deficiency, and anxiety. Urinary Urgency: Since Saturday s/p sexual contact. He had sexual nonpenetrative contact with a male partner on Saturday who later admitted to the pt that he is positive for chlamydia. No dysuria, fever, abdominal pain, or hematuria. The pt wants to be screened for STDs. Patient Active Problem List    Diagnosis Date Noted    Allergic rhinitis 10/19/2019    Anxiety 12/10/2018    Vitamin D deficiency 12/10/2018    Marijuana use 02/23/2018    Moderate major depression ([facility] [address] 02/23/2018    Tobacco dependence 08/25/2017       Current Outpatient Medications   Medication Sig Dispense Refill    docusate sodium 100 mg tab Take 100 mg by mouth two (2) times a day. 60 Tab 6    hydrocortisone (ANUSOL-HC) 2.5 % rectal cream Insert  into rectum four (4) times daily. 30 g 5    polyethylene glycol (Miralax) 17 gram packet Take 1 Packet by mouth daily. 30 Packet 5    ergocalciferol (ERGOCALCIFEROL) 50,000 unit capsule Take 1 Cap by mouth every seven (7) days. 4 Cap 6    venlafaxine-SR (EFFEXOR-XR) 37.5 mg capsule Take 1 Cap by mouth daily. 30 Cap 2    fluticasone propionate (FLONASE) 50 mcg/actuation nasal spray 2 Sprays by Both Nostrils route daily. 1 Bottle 11       No Known Allergies    No past medical history on file. No past surgical history on file.     Family History   Problem Relation Age of Onset    No Known Problems Mother     No Known Problems Father        Social History     Tobacco Use    Smoking status: Current Some Day Smoker     Packs/day: 0.50     Years: 2.00     Pack years: 1.00     Types: Cigarettes    Smokeless tobacco: Never Used   Substance Use Topics    Alcohol use: Yes     Alcohol/week: 1.0 standard drinks     Types: 1 Shots of liquor per week       ROS   Review of Systems   Constitutional: Negative for chills and fever. HENT: Negative for ear pain and sore throat. Eyes: Negative for blurred vision and pain. Respiratory: Negative for cough and shortness of breath. Cardiovascular: Negative for chest pain. Gastrointestinal: Negative for abdominal pain, blood in stool and melena. Hemorrhoidal pain from his last visit has resolved. Genitourinary: Negative for dysuria and hematuria. Musculoskeletal: Negative for joint pain and myalgias. Skin: Negative for rash. Neurological: Negative for tingling, focal weakness and headaches. Endo/Heme/Allergies: Does not bruise/bleed easily. Psychiatric/Behavioral: Negative for substance abuse. Objective     Vitals:    03/24/21 0752   BP: 127/75   Pulse: 74   Resp: 14   Temp: 98 °F (36.7 °C)   TempSrc: Temporal   SpO2: 97%   Weight: 160 lb (72.6 kg)   Height: 5' 8\" (1.727 m)   PainSc:   0 - No pain       Physical Exam  Vitals signs and nursing note reviewed. Constitutional:       Appearance: Normal appearance. HENT:      Head: Normocephalic and atraumatic. Right Ear: External ear normal.      Left Ear: External ear normal.   Eyes:      Conjunctiva/sclera: Conjunctivae normal.   Neck:      Musculoskeletal: Neck supple. Cardiovascular:      Rate and Rhythm: Normal rate and regular rhythm. Pulses: Normal pulses. Heart sounds: Normal heart sounds. Pulmonary:      Effort: Pulmonary effort is normal.      Breath sounds: Normal breath sounds. Abdominal:      General: Abdomen is flat. Bowel sounds are normal. There is no distension. Tenderness: There is no abdominal tenderness. Musculoskeletal:         General: No swelling (BUE) or tenderness (BUE). Lymphadenopathy:      Cervical: No cervical adenopathy.    Skin: General: Skin is warm and dry. Findings: No erythema. Neurological:      General: No focal deficit present. Mental Status: He is alert. Gait: Gait normal.   Psychiatric:         Mood and Affect: Mood normal.         LABS   Data Review:   Lab Results   Component Value Date/Time    WBC 4.5 (L) 10/18/2019 08:50 AM    HGB 15.7 10/18/2019 08:50 AM    HCT 47.7 10/18/2019 08:50 AM    PLATELET 892 66/17/5844 08:50 AM    MCV 91.4 10/18/2019 08:50 AM       Lab Results   Component Value Date/Time    Sodium 140 10/18/2019 08:50 AM    Potassium 4.0 10/18/2019 08:50 AM    Chloride 103 10/18/2019 08:50 AM    CO2 26 10/18/2019 08:50 AM    Anion gap 11 10/18/2019 08:50 AM    Glucose 83 10/18/2019 08:50 AM    BUN 13 10/18/2019 08:50 AM    Creatinine 1.00 10/18/2019 08:50 AM    BUN/Creatinine ratio 13 10/18/2019 08:50 AM    GFR est AA >60 10/18/2019 08:50 AM    GFR est non-AA >60 10/18/2019 08:50 AM    Calcium 9.2 10/18/2019 08:50 AM       No results found for: CHOL, CHOLPOCT, CHOLX, CHLST, CHOLV, HDL, HDLPOC, HDLP, LDL, LDLCPOC, LDLC, DLDLP, VLDLC, VLDL, TGLX, TRIGL, TRIGP, TGLPOCT, CHHD, CHHDX    No results found for: HBA1C, HGBE8, [id number], [id number]    Assessment/Plan:   Urinary urgency: +Exposure to Chlamydia. - Will rx for chlamydia given his sx  - Checking labs  - Safe sex counseling discussed. ORDERS:  - CT/NG/T.VAGINALIS AMPLIFICATION; Future  - HIV 1/2 AG/AB, 4TH GENERATION,W RFLX CONFIRM; Future  - HEPATITIS C AB; Future  - URINALYSIS W/ RFLX MICROSCOPIC; Future  - T PALLIDUM AB; Future        Health Maintenance Due   Topic Date Due    HPV Age 9Y-34Y (3 - Male 2-dose series) Never done       Lab review: labs are reviewed in the EHR    I have discussed the diagnosis with the patient and the intended plan as seen in the above orders. The patient has received an after-visit summary and questions were answered concerning future plans.   I have discussed medication side effects and warnings with the patient as well. I have reviewed the plan of care with the patient, accepted their input and they are in agreement with the treatment goals. All questions were answered. The patient understands the plan of care. Handouts provided today with above information. Pt instructed if symptoms worsen to call the office or report to the ED for continued care. Greater than 50% of the visit time was spent in counseling and/or coordination of care. Voice recognition was used to generate this report, which may have resulted in some phonetic based errors in grammar and contents. Even though attempts were made to correct all the mistakes, some may have been missed, and remained in the body of the document.           Miles Gleason MD

## 2021-03-24 NOTE — PATIENT INSTRUCTIONS
Safer Sex: Care Instructions Your Care Instructions Safer sex is a way to reduce your risk of getting an infection spread through sex. It can also help prevent pregnancy. Most infections that are spread through sex, also called sexually transmitted infections or STIs, can be cured. But some can decrease your chances of getting pregnant if they are not treated early. Others, such as herpes, have no cure. And some, such as HIV, can be deadly. Several products can help you practice safer sex and reduce your chance of STIs. One of the best is a condom. There are condoms for men and for women. The female condom is a tube of soft plastic with a closed end that is placed deep into the vagina. You can use a special rubber sheet (dental dam) for protection during oral sex. Disposable gloves can keep your hands from touching blood, semen, or other body fluids that can carry infections. Remember that birth control methods such as diaphragms, IUDs, foams, and birth control pills do not stop you from getting STIs. Follow-up care is a key part of your treatment and safety. Be sure to make and go to all appointments, and call your doctor if you are having problems. It's also a good idea to know your test results and keep a list of the medicines you take. How can you care for yourself at home? · Think about getting shots to prevent hepatitis A and hepatitis B. These two diseases can be spread through sex. You also can get hepatitis A if you eat infected food. · Use condoms or female condoms each time and every time you have sex. · Learn the right way to use a male condom: 
? Condoms come in several sizes. Make sure you use the right size. A condom that is too small can break easily. A condom that is too big can slip off during sex. Use a new condom each time you have sex. ? Be careful not to poke a hole in the condom when you open the wrapper. ? Squeeze the tip of the condom to keep out air.  
? Pull down the loose skin (foreskin) from the head of an uncircumcised penis. ? While squeezing the tip of the condom, unroll it all the way down to the base of the firm penis. ? Never use petroleum jelly (such as Vaseline), grease, hand lotion, baby oil, or anything with oil in it. These products can make holes in the condom. ? After sex, hold the condom on your penis as you remove your penis from your partner. This will keep semen from spilling out of the condom. · Learn to use a female condom: ? You can put in a female condom up to 8 hours before sex. ? Squeeze the smaller ring at the closed end and insert it deep into the vagina. The larger ring at the open end should stay outside the vagina. ? During sex, make sure the penis goes into the condom. ? After the penis is removed, close the open end of the condom by twisting it. Remove the condom. · Do not use a female condom and male condom at the same time. · Do not have sex with anyone who has symptoms of an STI, such as sores on the genitals or mouth. The herpes virus that causes cold sores can spread to and from the penis and vagina. · Do not drink a lot of alcohol or use drugs before sex. This can cause you to let down your guard and not practice safer sex. · Having one sex partner (who does not have STIs and does not have sex with anyone else) is a sure way to avoid STIs. · Talk to your partner before you have sex. Find out if he or she has or is at risk for any STI. Keep in mind that a person may be able to spread an STI even if he or she does not have symptoms. You and your partner may want to get an HIV test. You should get tested again 6 months later. Where can you learn more? Go to http://www.gray.com/ Enter I320 in the search box to learn more about \"Safer Sex: Care Instructions. \" Current as of: February 26, 2020               Content Version: 12.6 © 1640-5587 [employer].   
Care instructions adapted under license by [employer] (which disclaims liability or warranty for this information). If you have questions about a medical condition or this instruction, always ask your healthcare professional. [address] any warranty or liability for your use of this information.

## 2021-03-25 LAB
HCV AB SER IA-ACNC: 0.06 INDEX
HCV AB SERPL QL IA: NEGATIVE
HCV COMMENT,HCGAC: NORMAL
T PALLIDUM AB SER QL IA: NONREACTIVE

## 2021-03-26 LAB
C TRACH RRNA SPEC QL NAA+PROBE: NEGATIVE
HIV 1+2 AB+HIV1 P24 AG SERPL QL IA: NONREACTIVE
HIV12 RESULT COMMENT, HHIVC: NORMAL
N GONORRHOEA RRNA SPEC QL NAA+PROBE: NEGATIVE
SPECIMEN SOURCE: NORMAL
T VAGINALIS RRNA VAG QL NAA+PROBE: NEGATIVE

## 2021-03-26 NOTE — PROGRESS NOTES
Please let him know that he tested negative for gonorrhea, chlamydia, HIV, hepatitis C, and syphilis.     Dr. Angela Elizalde  [facility], [address] [place] [address], [address] [place] [address] Str.  Phone: [phone]  Fax: [phone]

## 2021-03-29 NOTE — PROGRESS NOTES
Patient contacted, patient identified with two identifiers (Name & ). Per Dr. Edel Garber patient is aware of results.

## 2022-01-05 ENCOUNTER — VIRTUAL VISIT (OUTPATIENT)
Dept: INTERNAL MEDICINE CLINIC | Age: 22
End: 2022-01-05
Payer: COMMERCIAL

## 2022-01-05 ENCOUNTER — TELEPHONE (OUTPATIENT)
Dept: INTERNAL MEDICINE CLINIC | Age: 22
End: 2022-01-05

## 2022-01-05 DIAGNOSIS — J06.9 UPPER RESPIRATORY TRACT INFECTION, UNSPECIFIED TYPE: Primary | ICD-10-CM

## 2022-01-05 PROCEDURE — 99214 OFFICE O/P EST MOD 30 MIN: CPT | Performed by: INTERNAL MEDICINE

## 2022-01-05 RX ORDER — ONDANSETRON 8 MG/1
8 TABLET, ORALLY DISINTEGRATING ORAL
Qty: 30 TABLET | Refills: 0 | Status: SHIPPED | OUTPATIENT
Start: 2022-01-05

## 2022-01-05 NOTE — LETTER
NOTIFICATION OF RETURN TO WORK / SCHOOL    1/5/2022    . Kristina Cruz      To Whom It May Concern:    Osmel Bearden III is my patient and is under my medical care. Please excuse his absence from work beginning 1/5/22. He will return to work on 1/10/22 with regular duties and/or activities . If there are questions or concerns please have the patient contact our office.         Sincerely,    Madisyn Hinkle MD

## 2022-01-05 NOTE — PROGRESS NOTES
Ching Bazzi III is a 24 y.o. male who was seen by synchronous (real-time) audio-video technology on 1/5/2022. Assessment & Plan:   URI: Likely viral.  Given his exposure to Covid positive individuals, I recommend that he get tested. -The patient will get a test at [place] [id number]. There are still slots available today for drive-through testing.  -She will let me know whether or not he has COVID-19. If he test positive, he is to monitor his oxygen saturation with a pulse oximeter, which she can purchase through drive-through at his local pharmacy. He should check his oxygen saturation once an hour at any point in time that he is awake. He is to go urgently to the ED if his oxygen saturation is less than 92%. If he does not have Covid, he is instructed to let me know so that we can continue to investigate his symptoms. The diarrhea is very concerning.  -Ordering Zofran as needed for nausea symptoms.  -I encouraged him to get plenty of rest and to drink lots of fluids.  -I will write a work note, allowing him to return to work on January 10, assuming that he is doing better, and is negative for Covid. -I instructed him to get his Covid booster shot when he is feeling better.  -For now, he should quarantine. Lab review: labs are reviewed in the EHR     I have discussed the diagnosis with the patient and the intended plan as seen in the above orders. I have discussed medication side effects and warnings with the patient as well. I have reviewed the plan of care with the patient, accepted their input and they are in agreement with the treatment goals. All questions were answered. The patient understands the plan of care. Pt instructed if symptoms worsen to call the office or report to the ED for continued care. Greater than 50% of the visit time was spent in counseling and/or coordination of care.      Voice recognition was used to generate this report, which may have resulted in some phonetic based errors in grammar and contents. Even though attempts were made to correct all the mistakes, some may have been missed, and remained in the body of the document. Subjective:   Susana Brizuela III was seen for   Chief Complaint   Patient presents with    Headache     The patient is a 39-year-old male with history of acne vulgaris, nicotine dependence, depression, marijuana use per EHR, vitamin D deficiency, and anxiety. URI:  He reports:  A HA that began yesterday. He developed chills. Since Monday, he's had nausea and an \"upset stomach. \" +Emesis. +Poor appetite. \"My throat is brenden dry. \" He has a cough. Sick contacts: a friend but he hasn't been around him in the past 1-2 wks. His roommate is suspected to be positive for Covid. His boyfriend's roommates are positive for Covid - he found out Monday. He had a fever on Monday. He had a fever off/on on Tuesday. His temperature was never taken so his fever is subjective. He is taking nyquil and sudafed. +Diarrhea. He had 5-10 stools yesterday. None of his stools were formed. Sx feel like when he had a sinus infection in the past but sx were more intense on Day 1 of sx and he didn't have the GI sx in the past with sinus infections. He has had 2 Covid vaccines (May) - Moderna. Prior to Admission medications    Medication Sig Start Date End Date Taking? Authorizing Provider   docusate sodium 100 mg tab Take 100 mg by mouth two (2) times a day. 2/19/21   Tyrone Hinson MD   polyethylene glycol (Miralax) 17 gram packet Take 1 Packet by mouth daily. 2/19/21   Tyrone Hinson MD   hydrocortisone (ANUSOL-HC) 2.5 % rectal cream Insert  into rectum four (4) times daily. 2/19/21   Tyrone Hinson MD   ergocalciferol (ERGOCALCIFEROL) 50,000 unit capsule Take 1 Cap by mouth every seven (7) days. 10/21/19   Tyrone Hinson MD   venlafaxine-SR [facility] 37.5 mg capsule Take 1 Cap by mouth daily.  10/18/19   Tyrone Hinson MD   fluticasone propionate (FLONASE) 50 mcg/actuation nasal spray 2 Sprays by Both Nostrils route daily. 10/18/19   Carole Pta MD     No Known Allergies  No past medical history on file. No past surgical history on file. Family History   Problem Relation Age of Onset    No Known Problems Mother     No Known Problems Father      Social History     Socioeconomic History    Marital status: SINGLE   Tobacco Use    Smoking status: Current Some Day Smoker     Packs/day: 0.50     Years: 2.00     Pack years: 1.00     Types: Cigarettes    Smokeless tobacco: Never Used   Substance and Sexual Activity    Alcohol use: Yes     Alcohol/week: 1.0 standard drink     Types: 1 Shots of liquor per week    Drug use: Not Currently     Frequency: 7.0 times per week     Types: Marijuana     Comment: patient quit in 2018     Sexual activity: Not Currently     Partners: Male     Birth control/protection: None       ROS:  Gen: See HPI  HEENT: see HPI  CV: No CP  Resp: +cough; no SOB  GI: See HPI  : No hematuria/dysuria  Derm: No rash  Neuro: No new paresthesias/weakness  Musc: No new myalgias/jt pain  Psych: No depression sx were discussed today.       Objective:     General: alert, cooperative, no distress   Mental  status: mental status: alert, oriented to person, place, and time, normal mood, behavior, speech, dress, motor activity, and thought processes   Resp: resp: normal effort and no respiratory distress   Neuro: neuro: no gross deficits   Skin: skin: no discoloration or lesions of concern on visible areas     LABS:  Lab Results   Component Value Date/Time    Sodium 140 10/18/2019 08:50 AM    Potassium 4.0 10/18/2019 08:50 AM    Chloride 103 10/18/2019 08:50 AM    CO2 26 10/18/2019 08:50 AM    Anion gap 11 10/18/2019 08:50 AM    Glucose 83 10/18/2019 08:50 AM    BUN 13 10/18/2019 08:50 AM    Creatinine 1.00 10/18/2019 08:50 AM    BUN/Creatinine ratio 13 10/18/2019 08:50 AM    GFR est AA >60 10/18/2019 08:50 AM    GFR est non-AA >60 10/18/2019 08:50 AM Calcium 9.2 10/18/2019 08:50 AM       No results found for: CHOL, CHOLPOCT, CHOLX, CHLST, CHOLV, HDL, HDLPOC, HDLP, LDL, LDLCPOC, LDLC, DLDLP, VLDLC, VLDL, TGLX, TRIGL, TRIGP, TGLPOCT, CHHD, CHHDX    Lab Results   Component Value Date/Time    WBC 4.5 (L) 10/18/2019 08:50 AM    HGB 15.7 10/18/2019 08:50 AM    HCT 47.7 10/18/2019 08:50 AM    PLATELET 571 31/90/6443 08:50 AM    MCV 91.4 10/18/2019 08:50 AM       No results found for: HBA1C, JCM1RWSZ, [id number]    Lab Results   Component Value Date/Time    TSH 1.52 10/18/2019 08:50 AM           Due to this being a TeleHealth  evaluation, many elements of the physical examination are unable to be assessed. The pt was seen by synchronous (real-time) audio-video technology, and/or her healthcare decision maker, is aware that this patient-initiated, Telehealth encounter is a billable service, with coverage as determined by her insurance carrier. She is aware that she may receive a bill and has provided verbal consent to proceed: Yes. The pt is being evaluated by a video visit encounter for concerns as above. A caregiver was present when appropriate. Due to this being a TeleHealth encounter (During [facility] public health emergency), evaluation of the following organ systems was limited: Vitals/Constitutional/EENT/Resp/CV/GI//MS/Neuro/Skin/Heme-Lymph-Imm. Pursuant to the emergency declaration under the [facility], 1135 waiver authority and the [employer] Act, this Virtual  Visit was conducted, with patient's (and/or legal guardian's) consent, to reduce the patient's risk of exposure to COVID-19 and provide necessary medical care. Services were provided through a video synchronous discussion virtually to substitute for in-person clinic visit. Patient and provider were located at their individual home/office respectively.       We discussed the expected course, resolution and complications of the diagnosis(es) in detail. Medication risks, benefits, costs, interactions, and alternatives were discussed as indicated. I advised him to contact the office if his condition worsens, changes or fails to improve as anticipated. He expressed understanding with the diagnosis(es) and plan.      Ruth Ann Hernández MD

## 2022-01-05 NOTE — TELEPHONE ENCOUNTER
Per pt he needed to give the nurse the fax # to his employer for work note.     Fax to [phone]  Attn: Darius Montalvo

## 2022-01-05 NOTE — TELEPHONE ENCOUNTER
Pt calling asking to speak to April. Says Dr. Candice Lott told him to call you. He would not give me any other info.